# Patient Record
Sex: MALE | Race: WHITE
[De-identification: names, ages, dates, MRNs, and addresses within clinical notes are randomized per-mention and may not be internally consistent; named-entity substitution may affect disease eponyms.]

---

## 2018-01-19 ENCOUNTER — HOSPITAL ENCOUNTER (OUTPATIENT)
Dept: HOSPITAL 47 - RADCTMAIN | Age: 68
Discharge: HOME | End: 2018-01-19
Payer: COMMERCIAL

## 2018-01-19 DIAGNOSIS — I70.0: ICD-10-CM

## 2018-01-19 DIAGNOSIS — J98.11: ICD-10-CM

## 2018-01-19 DIAGNOSIS — R91.1: ICD-10-CM

## 2018-01-19 DIAGNOSIS — I25.10: ICD-10-CM

## 2018-01-19 DIAGNOSIS — I71.2: Primary | ICD-10-CM

## 2018-01-19 LAB — BUN SERPL-SCNC: 20 MG/DL (ref 9–20)

## 2018-01-19 PROCEDURE — 84520 ASSAY OF UREA NITROGEN: CPT

## 2018-01-19 PROCEDURE — 36415 COLL VENOUS BLD VENIPUNCTURE: CPT

## 2018-01-19 PROCEDURE — 82565 ASSAY OF CREATININE: CPT

## 2018-01-19 PROCEDURE — 71260 CT THORAX DX C+: CPT

## 2018-01-19 NOTE — CT
EXAMINATION TYPE: CT chest w con

 

DATE OF EXAM: 1/19/2018

 

COMPARISON: 4/14/2016

 

HISTORY: 67-year-old male follow-up Renal Artery Aneurysm

 

TECHNIQUE: Contiguous axial scanning of the chest after the administration of 100 mL of Omnipaque 300
.  Coronal/sagittal reconstructions performed.

 

CT DLP: 895.2mGycm. Automatic exposure control utilized for a dose reduction.

 

 

FINDINGS:

Heart normal size without pericardial effusion. Coronary vessel calcifications are present in remarka
ble for coronary artery disease. Moderate aortic valvular calcifications are also noted.

 

The ascending aorta measures 4.8 cm versus 4.6 cm on 4/14/2016.

Conventional arch vessel branching anatomy.

Upper descending thoracic aorta mildly aneurysmal at 3.4 cm, unchanged.

Mid descending thoracic aorta mildly aneurysmal at 3.2 cm, unchanged.

Lower descending thoracic aorta is ectatic and 2.9 cm, unchanged.

 

No thoracic lymphadenopathy by CT size criteria.

 

Redemonstrated elevation of the right hemidiaphragm with right basilar atelectasis and volume loss. N
o consolidation or pleural effusion. 8 mm posterior right basilar pulmonary nodule is unchanged sugge
sting a benign etiology.

 

Visualized upper abdomen shows small hiatal hernia.

 

Bones: Endplate spondylosis mid to lower thoracic spine.

 

 

 

IMPRESSION:  

 

1. Aneurysmal thoracic aorta (ascending slightly increased measuring 4.8 cm versus 4.6 cm on 4/14/201
6) (descending stable measuring up to 3.4 cm).

2. Stable elevation of the right hemidiaphragm with right basilar volume loss and atelectasis.

3. CAD and moderate aortic valvular calcifications.

4. The 8 mm posterior right basilar pulmonary nodule stable for nearly 2 years suggesting a benign et
iology.

## 2018-07-25 ENCOUNTER — HOSPITAL ENCOUNTER (OUTPATIENT)
Dept: HOSPITAL 47 - LABPAT | Age: 68
Discharge: HOME | End: 2018-07-25
Payer: MEDICARE

## 2018-07-25 DIAGNOSIS — Z01.812: Primary | ICD-10-CM

## 2018-07-25 DIAGNOSIS — I10: ICD-10-CM

## 2018-07-25 DIAGNOSIS — I35.0: ICD-10-CM

## 2018-07-25 DIAGNOSIS — I48.1: ICD-10-CM

## 2018-07-25 LAB
ANION GAP SERPL CALC-SCNC: 6 MMOL/L
BUN SERPL-SCNC: 17 MG/DL (ref 9–20)
CHLORIDE SERPL-SCNC: 111 MMOL/L (ref 98–107)
CO2 SERPL-SCNC: 25 MMOL/L (ref 22–30)
ERYTHROCYTE [DISTWIDTH] IN BLOOD BY AUTOMATED COUNT: 4.82 M/UL (ref 4.3–5.9)
ERYTHROCYTE [DISTWIDTH] IN BLOOD: 14.6 % (ref 11.5–15.5)
HCT VFR BLD AUTO: 42.2 % (ref 39–53)
HGB BLD-MCNC: 14 GM/DL (ref 13–17.5)
MCH RBC QN AUTO: 28.9 PG (ref 25–35)
MCHC RBC AUTO-ENTMCNC: 33 G/DL (ref 31–37)
MCV RBC AUTO: 87.5 FL (ref 80–100)
PLATELET # BLD AUTO: 117 K/UL (ref 150–450)
POTASSIUM SERPL-SCNC: 4.1 MMOL/L (ref 3.5–5.1)
SODIUM SERPL-SCNC: 142 MMOL/L (ref 137–145)
WBC # BLD AUTO: 5.4 K/UL (ref 3.8–10.6)

## 2018-07-25 PROCEDURE — 85027 COMPLETE CBC AUTOMATED: CPT

## 2018-07-25 PROCEDURE — 36415 COLL VENOUS BLD VENIPUNCTURE: CPT

## 2018-07-25 PROCEDURE — 82565 ASSAY OF CREATININE: CPT

## 2018-07-25 PROCEDURE — 84520 ASSAY OF UREA NITROGEN: CPT

## 2018-07-25 PROCEDURE — 80051 ELECTROLYTE PANEL: CPT

## 2018-08-06 ENCOUNTER — HOSPITAL ENCOUNTER (OUTPATIENT)
Dept: HOSPITAL 47 - CATHCVL | Age: 68
Discharge: HOME | End: 2018-08-06
Payer: MEDICARE

## 2018-08-06 VITALS — HEART RATE: 68 BPM

## 2018-08-06 VITALS — RESPIRATION RATE: 18 BRPM | DIASTOLIC BLOOD PRESSURE: 77 MMHG | SYSTOLIC BLOOD PRESSURE: 132 MMHG

## 2018-08-06 VITALS — BODY MASS INDEX: 24.3 KG/M2

## 2018-08-06 VITALS — TEMPERATURE: 97.4 F

## 2018-08-06 DIAGNOSIS — E78.00: ICD-10-CM

## 2018-08-06 DIAGNOSIS — I10: ICD-10-CM

## 2018-08-06 DIAGNOSIS — Z79.4: ICD-10-CM

## 2018-08-06 DIAGNOSIS — Z79.82: ICD-10-CM

## 2018-08-06 DIAGNOSIS — I48.1: Primary | ICD-10-CM

## 2018-08-06 DIAGNOSIS — Z79.899: ICD-10-CM

## 2018-08-06 DIAGNOSIS — Z79.01: ICD-10-CM

## 2018-08-06 DIAGNOSIS — E11.9: ICD-10-CM

## 2018-08-06 DIAGNOSIS — E78.5: ICD-10-CM

## 2018-08-06 DIAGNOSIS — I35.0: ICD-10-CM

## 2018-08-06 LAB
GLUCOSE BLD-MCNC: 107 MG/DL (ref 75–99)
GLUCOSE BLD-MCNC: 114 MG/DL (ref 75–99)

## 2018-08-06 PROCEDURE — 93320 DOPPLER ECHO COMPLETE: CPT

## 2018-08-06 PROCEDURE — 93312 ECHO TRANSESOPHAGEAL: CPT

## 2018-08-06 PROCEDURE — 92960 CARDIOVERSION ELECTRIC EXT: CPT

## 2018-08-06 PROCEDURE — 93325 DOPPLER ECHO COLOR FLOW MAPG: CPT

## 2018-08-06 RX ADMIN — BENZOCAINE ONE SPRAY: 200 SPRAY DENTAL; ORAL; PERIODONTAL at 07:25

## 2018-08-06 RX ADMIN — BENZOCAINE ONE SPRAY: 200 SPRAY DENTAL; ORAL; PERIODONTAL at 07:15

## 2018-08-06 RX ADMIN — BENZOCAINE ONE SPRAY: 200 SPRAY DENTAL; ORAL; PERIODONTAL at 07:35

## 2018-08-06 NOTE — P.TEE
Indications for Procedure(s): 


Rule out left atrial thrombus before cardioversion


Date of Procedure: 08/06/18


Preoperative Diagnosis: 


Atrial fibrillation with controlled ventricular response, hypertension and 

diabetes


Postoperative Diagnosis: 


The same


Procedure(s) Performed: 


PATRICIO


Description of Procedure(s): 





INDICATION: To assess and rule out left atrial thrombus.  History of bicuspid 

aortic valve





CONSENT:.  Informed consent was obtained from patient and family





PROCEDURE:.  Patient was brought to the lab in a fasting state.  He was prepped 

and draped in the usual fashion.  Patient was given IV sedation by department 

of anesthesia.  The throat was sprayed with Cetacaine.  A lubricated Omni probe 

was introduced into the oropharynx and was advanced into the esophagus.  

Multiple views were obtained.  Patient tolerated the procedure well





FINDINGS:.  The aortic valve is thickened and mildly calcified and appears to 

be bicuspid.  No significant regurgitation or stenosis noted.  The mitral valve 

showed mild regurgitation but appeared to be structurally normal.  Trace branch 

with mild regurgitation.  The interatrial septum is intact without any shunt.  

Injection of the contrast saline bubbles did not reveal any crossover bubbles 

across the interatrial septum.  Left atrial appendage is free of any clot.  

Left ventricle function appeared to be preserved.  Biatrial enlargement.  No 

significant plaque in the aorta.





IMPRESSION:.  No clot in the left atrial appendage.  Bicuspid aortic valve 

without any significant valvular dysfunction.  Preserved LV function





PLAN:.  Proceed with cardioversion.

## 2018-08-06 NOTE — P.PCN
Date of Procedure: 08/06/18


Preoperative Diagnosis: 


Atrial fibrillation with controlled ventricular response.


Postoperative Diagnosis: 


Successful cardioversion to sinus rhythm


Procedure(s) Performed: 


PATRICIO followed by cardioversion


Description of Procedure: 


Patient was brought in a fasting state.  Patient was prepped and draped in the 

usual fashion.  Patient IV sedation was given by department of anesthesia.  

Syncopal shocks of 200, 300 followed with the 350 J was applied.  Patient 

converted to sinus rhythm after last shock.  Patient tolerated the procedure 

well.  No immediate complications.





Impression: Successful cardioversion.





Plan: Patient will continue on current medical therapy.  We'll also initiate 

him on flecainide 150 mg by mouth twice a day.

## 2019-02-22 ENCOUNTER — HOSPITAL ENCOUNTER (OUTPATIENT)
Dept: HOSPITAL 47 - RADCTMAIN | Age: 69
Discharge: HOME | End: 2019-02-22
Attending: THORACIC SURGERY (CARDIOTHORACIC VASCULAR SURGERY)
Payer: MEDICARE

## 2019-02-22 DIAGNOSIS — I25.10: ICD-10-CM

## 2019-02-22 DIAGNOSIS — R91.1: ICD-10-CM

## 2019-02-22 DIAGNOSIS — I71.2: Primary | ICD-10-CM

## 2019-02-22 LAB — BUN SERPL-SCNC: 19 MG/DL (ref 9–20)

## 2019-02-22 PROCEDURE — 82565 ASSAY OF CREATININE: CPT

## 2019-02-22 PROCEDURE — 36415 COLL VENOUS BLD VENIPUNCTURE: CPT

## 2019-02-22 PROCEDURE — 84520 ASSAY OF UREA NITROGEN: CPT

## 2019-02-22 PROCEDURE — 71275 CT ANGIOGRAPHY CHEST: CPT

## 2019-02-22 NOTE — CT
EXAMINATION TYPE: CT angio chest

 

DATE OF EXAM: 2/22/2019

 

COMPARISON: 1/19/2018, 4/14/2016 and 10/29/2015

 

HISTORY: Thoracic aortic aneurysm. Follow-up exam. Right lower lobe pulmonary nodule.

 

CT DLP: 887.3 mGycm. Automated Exposure Control for Dose Reduction was Utilized.

 

 

CONTRAST: 

CTA scan of the thorax is performed with IV Contrast, patient injected with 100 mL of Isovue 370, pul
monary embolism protocol.  MIP Images are created on CT scanner and reviewed.

 

FINDINGS:

 

LUNGS: There is strand-like bibasilar subsegmental atelectasis. The previously noted approximately 7 
mm pulmonary nodule again is stable 4 greater than 2 years dating back to 10/29/2015 and should be co
nsidered benign..  The tracheobronchial tree is patent.

 

MEDIASTINUM: There is evidence of central pulmonary embolism.  Ascending thoracic aorta is again enla
rged measuring approximately 4.7 cm on coronal image 16. Aortic root on image 18 is mildly enlarged m
easuring 4.1 cm. Descending thoracic aorta is within normal limits measuring 3.2 cm in its midportion
, stable from the prior. Aortic leaflet calcifications are again noted. Mild coronary artery calcific
ations are seen. There are no greater than 1 cm hilar or mediastinal lymph nodes.   No cardiomegaly o
r pericardial effusion is seen. Right hemidiaphragm elevation is redemonstrated.

 

OTHER: There is decreased attenuation of the hepatic parenchyma suggesting underlying hepatic steatos
is. Splenule is noted adjacent to the native spleen. There is a slight nodular morphology of the bila
teral adrenal glands although they maintain a normal adreniform shape suggesting underlying adrenal g
land hyperplasia. There is a very small hiatal hernia. Partial visualization of a left lower pole cys
tic renal lesion measures 1.3 cm. Moderate multilevel degenerative changes of the spine are noted.

 

IMPRESSION: 

1. Stable aneurysmal dilatation of the ascending thoracic aorta measuring approximately 4.7 cm. Aorti
c root is also mildly enlarged measuring 4.1 cm.

2. Stability of the right basilar pulmonary nodule dates back to 2015. This should be considered crys
gn.

3. Redemonstration of coronary artery calcifications, a marker of coronary artery disease.

## 2019-10-17 ENCOUNTER — HOSPITAL ENCOUNTER (OUTPATIENT)
Dept: HOSPITAL 47 - RADCTMAIN | Age: 69
Discharge: HOME | End: 2019-10-17
Attending: INTERNAL MEDICINE
Payer: MEDICARE

## 2019-10-17 DIAGNOSIS — I71.2: Primary | ICD-10-CM

## 2019-10-17 LAB — BUN SERPL-SCNC: 23 MG/DL (ref 9–20)

## 2019-10-17 PROCEDURE — 71275 CT ANGIOGRAPHY CHEST: CPT

## 2019-10-17 PROCEDURE — 84520 ASSAY OF UREA NITROGEN: CPT

## 2019-10-17 PROCEDURE — 82565 ASSAY OF CREATININE: CPT

## 2019-10-17 NOTE — CT
CT CHEST FOR PULMONARY EMBOLISM.

 

EXAMINATION TYPE: CT angio chest

 

DATE OF EXAM: 10/17/2019

 

INDICATION: Follow up known thoracic aortic aneurysm

 

CT DLP: 584.6 mGycm, Automated exposure control for dose reduction was used.

 

CONTRAST: Patient injected with 100 mL of Isovue 370.

 

COMPARISON: 2/22/2019

 

TECHNIQUE: CT of the chest is performed on a spiral scan at 2 mm thick sections.  Study is performed 
with intravenous contrast timed for evaluation for pulmonary embolism.  This will limit additional po
rtions of the evaluation.  3-D MIP images reconstructed by the technologist are reviewed on the compu
ter in the coronal and sagittal planes. 

 

FINDINGS:

No persistent filling defects are evident to suggest an acute pulmonary embolism.

 

No mediastinal or hilar adenopathy enlarged by CT criteria is evident.  The ascending aorta diameter 
at the level of the main pulmonary artery is 4.8 cm.  The main pulmonary artery diameter at the bifur
cation is 3.2 cm.

 

Lung windows are clear.

 

Limited CT section through the upper abdomen are unremarkable.

 

IMPRESSIONS:

1. Aneurysmal prominence of the ascending thoracic aorta at the level of the main pulmonary artery me
asuring 4.8 cm. The aorta tapers to its visualized course to the diaphragm.

2. No acute pulmonary embolism.